# Patient Record
Sex: FEMALE | HISPANIC OR LATINO | Employment: UNEMPLOYED | URBAN - METROPOLITAN AREA
[De-identification: names, ages, dates, MRNs, and addresses within clinical notes are randomized per-mention and may not be internally consistent; named-entity substitution may affect disease eponyms.]

---

## 2023-05-24 DIAGNOSIS — Z82.79 FAMILY HISTORY OF CONGENITAL OR GENETIC CONDITION: Primary | ICD-10-CM

## 2023-06-14 ENCOUNTER — TELEPHONE (OUTPATIENT)
Dept: ENDOCRINOLOGY | Facility: CLINIC | Age: 23
End: 2023-06-14

## 2023-06-14 NOTE — TELEPHONE ENCOUNTER
With the aid of a [employer] interpretor, I spoke to Linda during the genetic counseling appointment for her son, John.  At that visit informed consent for exome sequencing was obtained.  Please see John's clinic note for a more detailed review of this discussion.  Parental samples are needed to aid in interpretation and Linda provided informed consent to collect and use her sample.  She also consented to the return of secondary findings for herself and John.  Lab appointments for the family were scheduled at their convenience and I remain available for any additional questions or concerns.       Charisse Maldonado MS [facility]  Genetic Counselor  Division of Genetics and Metabolism

## 2023-06-15 ENCOUNTER — LAB (OUTPATIENT)
Dept: LAB | Facility: CLINIC | Age: 23
End: 2023-06-15

## 2023-06-15 DIAGNOSIS — Z82.79 FAMILY HISTORY OF CONGENITAL OR GENETIC CONDITION: ICD-10-CM

## 2023-08-25 LAB
SIGNIFICANT RESULTS: NORMAL
SPECIMEN DESCRIPTION: NORMAL

## 2024-12-15 ENCOUNTER — HEALTH MAINTENANCE LETTER (OUTPATIENT)
Age: 24
End: 2024-12-15